# Patient Record
Sex: MALE | URBAN - METROPOLITAN AREA
[De-identification: names, ages, dates, MRNs, and addresses within clinical notes are randomized per-mention and may not be internally consistent; named-entity substitution may affect disease eponyms.]

---

## 2019-04-29 ENCOUNTER — OFFICE VISIT (OUTPATIENT)
Dept: URBAN - METROPOLITAN AREA CLINIC 29 | Facility: CLINIC | Age: 49
End: 2019-04-29
Payer: COMMERCIAL

## 2019-04-29 DIAGNOSIS — H52.13 MYOPIA, BILATERAL: ICD-10-CM

## 2019-04-29 DIAGNOSIS — H40.059 OCULAR HYPERTENSION, UNSPECIFIED EYE: ICD-10-CM

## 2019-04-29 PROCEDURE — 92004 COMPRE OPH EXAM NEW PT 1/>: CPT | Performed by: OPTOMETRIST

## 2019-04-29 ASSESSMENT — INTRAOCULAR PRESSURE
OS: 23
OD: 21

## 2019-04-29 ASSESSMENT — VISUAL ACUITY
OD: 20/20
OS: 20/20

## 2019-04-29 NOTE — IMPRESSION/PLAN
Impression: Type 2 diabetes mellitus w/o complication: G39.4. OU. Plan: No Non-Proliferative Diabetic Retinopathy, no Diabetic Macular Edema and no Neovascularization of the iris, disc, or elsewhere. Discussed ocular and systemic benefits of blood sugar control. Send notes to PCP. Check annually.

## 2019-04-29 NOTE — IMPRESSION/PLAN
Impression: Myopia, bilateral: H52.13. OU. Plan: Refractive error accounts for symptoms. Release SRX.

## 2019-04-29 NOTE — IMPRESSION/PLAN
Impression: Ocular hypertension, unspecified eye: H40.059. OU. Plan: Elevated IOP OS>OD today. Optic nerve appearance WNL OU. Pt ed re: condition. RTC 1-3 months x IOP check. No tx at this time.

## 2020-06-29 ENCOUNTER — OFFICE VISIT (OUTPATIENT)
Dept: URBAN - METROPOLITAN AREA CLINIC 29 | Facility: CLINIC | Age: 50
End: 2020-06-29
Payer: COMMERCIAL

## 2020-06-29 DIAGNOSIS — E11.9 TYPE 2 DIABETES MELLITUS W/O COMPLICATION: Primary | ICD-10-CM

## 2020-06-29 PROCEDURE — 92014 COMPRE OPH EXAM EST PT 1/>: CPT | Performed by: OPTOMETRIST

## 2020-06-29 ASSESSMENT — INTRAOCULAR PRESSURE
OS: 20
OD: 18

## 2020-06-29 ASSESSMENT — KERATOMETRY
OS: 42.13
OD: 42.13

## 2020-06-29 ASSESSMENT — VISUAL ACUITY
OS: 20/20
OD: 20/20

## 2020-06-29 NOTE — IMPRESSION/PLAN
Impression: Type 2 diabetes mellitus w/o complication: O97.8. OU. Plan: No Non-Proliferative Diabetic Retinopathy, no Diabetic Macular Edema and no Neovascularization of the iris, disc, or elsewhere. Discussed ocular and systemic benefits of blood sugar control. Send notes to PCP. Check annually. RTC 1 year x CEE.